# Patient Record
Sex: MALE | Race: WHITE | NOT HISPANIC OR LATINO | Employment: OTHER | URBAN - METROPOLITAN AREA
[De-identification: names, ages, dates, MRNs, and addresses within clinical notes are randomized per-mention and may not be internally consistent; named-entity substitution may affect disease eponyms.]

---

## 2023-01-08 ENCOUNTER — APPOINTMENT (OUTPATIENT)
Dept: RADIOLOGY | Facility: MEDICAL CENTER | Age: 23
End: 2023-01-08
Attending: EMERGENCY MEDICINE
Payer: COMMERCIAL

## 2023-01-08 ENCOUNTER — HOSPITAL ENCOUNTER (EMERGENCY)
Facility: MEDICAL CENTER | Age: 23
End: 2023-01-08
Attending: EMERGENCY MEDICINE
Payer: COMMERCIAL

## 2023-01-08 VITALS
RESPIRATION RATE: 14 BRPM | WEIGHT: 166.23 LBS | SYSTOLIC BLOOD PRESSURE: 100 MMHG | TEMPERATURE: 96.9 F | BODY MASS INDEX: 25.19 KG/M2 | HEART RATE: 64 BPM | DIASTOLIC BLOOD PRESSURE: 55 MMHG | OXYGEN SATURATION: 94 % | HEIGHT: 68 IN

## 2023-01-08 DIAGNOSIS — K04.7 DENTAL INFECTION: ICD-10-CM

## 2023-01-08 LAB
ANION GAP SERPL CALC-SCNC: 11 MMOL/L (ref 7–16)
BASOPHILS # BLD AUTO: 0.3 % (ref 0–1.8)
BASOPHILS # BLD: 0.03 K/UL (ref 0–0.12)
BUN SERPL-MCNC: 11 MG/DL (ref 8–22)
CALCIUM SERPL-MCNC: 9.6 MG/DL (ref 8.5–10.5)
CHLORIDE SERPL-SCNC: 100 MMOL/L (ref 96–112)
CO2 SERPL-SCNC: 28 MMOL/L (ref 20–33)
CREAT SERPL-MCNC: 0.85 MG/DL (ref 0.5–1.4)
EOSINOPHIL # BLD AUTO: 0.17 K/UL (ref 0–0.51)
EOSINOPHIL NFR BLD: 1.7 % (ref 0–6.9)
ERYTHROCYTE [DISTWIDTH] IN BLOOD BY AUTOMATED COUNT: 40.3 FL (ref 35.9–50)
GFR SERPLBLD CREATININE-BSD FMLA CKD-EPI: 125 ML/MIN/1.73 M 2
GLUCOSE SERPL-MCNC: 108 MG/DL (ref 65–99)
HCT VFR BLD AUTO: 48.5 % (ref 42–52)
HGB BLD-MCNC: 16.3 G/DL (ref 14–18)
IMM GRANULOCYTES # BLD AUTO: 0.04 K/UL (ref 0–0.11)
IMM GRANULOCYTES NFR BLD AUTO: 0.4 % (ref 0–0.9)
LYMPHOCYTES # BLD AUTO: 2.07 K/UL (ref 1–4.8)
LYMPHOCYTES NFR BLD: 21.1 % (ref 22–41)
MCH RBC QN AUTO: 31.2 PG (ref 27–33)
MCHC RBC AUTO-ENTMCNC: 33.6 G/DL (ref 33.7–35.3)
MCV RBC AUTO: 92.9 FL (ref 81.4–97.8)
MONOCYTES # BLD AUTO: 1.17 K/UL (ref 0–0.85)
MONOCYTES NFR BLD AUTO: 11.9 % (ref 0–13.4)
NEUTROPHILS # BLD AUTO: 6.35 K/UL (ref 1.82–7.42)
NEUTROPHILS NFR BLD: 64.6 % (ref 44–72)
NRBC # BLD AUTO: 0 K/UL
NRBC BLD-RTO: 0 /100 WBC
PLATELET # BLD AUTO: 304 K/UL (ref 164–446)
PMV BLD AUTO: 8.6 FL (ref 9–12.9)
POTASSIUM SERPL-SCNC: 4.6 MMOL/L (ref 3.6–5.5)
RBC # BLD AUTO: 5.22 M/UL (ref 4.7–6.1)
SODIUM SERPL-SCNC: 139 MMOL/L (ref 135–145)
WBC # BLD AUTO: 9.8 K/UL (ref 4.8–10.8)

## 2023-01-08 PROCEDURE — 36415 COLL VENOUS BLD VENIPUNCTURE: CPT

## 2023-01-08 PROCEDURE — 70487 CT MAXILLOFACIAL W/DYE: CPT

## 2023-01-08 PROCEDURE — 700105 HCHG RX REV CODE 258: Performed by: EMERGENCY MEDICINE

## 2023-01-08 PROCEDURE — 99284 EMERGENCY DEPT VISIT MOD MDM: CPT

## 2023-01-08 PROCEDURE — 80048 BASIC METABOLIC PNL TOTAL CA: CPT

## 2023-01-08 PROCEDURE — 96375 TX/PRO/DX INJ NEW DRUG ADDON: CPT

## 2023-01-08 PROCEDURE — 96365 THER/PROPH/DIAG IV INF INIT: CPT

## 2023-01-08 PROCEDURE — 85025 COMPLETE CBC W/AUTO DIFF WBC: CPT

## 2023-01-08 PROCEDURE — 700117 HCHG RX CONTRAST REV CODE 255: Performed by: EMERGENCY MEDICINE

## 2023-01-08 PROCEDURE — 700111 HCHG RX REV CODE 636 W/ 250 OVERRIDE (IP): Performed by: EMERGENCY MEDICINE

## 2023-01-08 RX ORDER — MORPHINE SULFATE 4 MG/ML
4 INJECTION INTRAVENOUS ONCE
Status: COMPLETED | OUTPATIENT
Start: 2023-01-08 | End: 2023-01-08

## 2023-01-08 RX ORDER — AMOXICILLIN AND CLAVULANATE POTASSIUM 875; 125 MG/1; MG/1
1 TABLET, FILM COATED ORAL 2 TIMES DAILY
Qty: 20 TABLET | Refills: 0 | Status: SHIPPED | OUTPATIENT
Start: 2023-01-08 | End: 2023-01-18

## 2023-01-08 RX ORDER — KETOROLAC TROMETHAMINE 30 MG/ML
15 INJECTION, SOLUTION INTRAMUSCULAR; INTRAVENOUS ONCE
Status: COMPLETED | OUTPATIENT
Start: 2023-01-08 | End: 2023-01-08

## 2023-01-08 RX ORDER — HYDROCODONE BITARTRATE AND ACETAMINOPHEN 5; 325 MG/1; MG/1
1 TABLET ORAL EVERY 6 HOURS PRN
Qty: 12 TABLET | Refills: 0 | Status: SHIPPED | OUTPATIENT
Start: 2023-01-08 | End: 2023-01-11

## 2023-01-08 RX ADMIN — AMPICILLIN AND SULBACTAM 3 G: 1; 2 INJECTION, POWDER, FOR SOLUTION INTRAMUSCULAR; INTRAVENOUS at 16:00

## 2023-01-08 RX ADMIN — IOHEXOL 75 ML: 350 INJECTION, SOLUTION INTRAVENOUS at 17:17

## 2023-01-08 RX ADMIN — MORPHINE SULFATE 4 MG: 4 INJECTION INTRAVENOUS at 15:57

## 2023-01-08 RX ADMIN — KETOROLAC TROMETHAMINE 15 MG: 30 INJECTION, SOLUTION INTRAMUSCULAR; INTRAVENOUS at 15:57

## 2023-01-08 ASSESSMENT — LIFESTYLE VARIABLES: DO YOU DRINK ALCOHOL: NO

## 2023-01-08 NOTE — ED NOTES
Pt ambulatory to room with steady gait. Agree with triage note.   Has had pain to area about a week, since wisdom tooth began to come out. FT sided facial swelling started 2 days ago.

## 2023-01-08 NOTE — ED TRIAGE NOTES
Chief Complaint   Patient presents with    Dental Pain     Swelling to right lower jaw due to wisdom tooth impaction.

## 2023-01-09 NOTE — ED PROVIDER NOTES
"ED Provider Note    CHIEF COMPLAINT  Chief Complaint   Patient presents with    Dental Pain     Swelling to right lower jaw due to wisdom tooth impaction.        HPI/ROS  Car Esquivel Jr. is a 22 y.o. male who presents to the emergency department complaining of dental pain associated with a right lower wisdom tooth.  The patient has been aware that the wisdom tooth is impacted and he started developing pain 2 days ago and now he is developing some pain on the cheek and adjacent to the mandible on the right side so is come to the emergency department for evaluation    PAST MEDICAL HISTORY       SURGICAL HISTORY  patient denies any surgical history    FAMILY HISTORY  No family history on file.    SOCIAL HISTORY  Social History     Tobacco Use    Smoking status: Not on file    Smokeless tobacco: Not on file   Substance and Sexual Activity    Alcohol use: Not on file    Drug use: Not on file    Sexual activity: Not on file       CURRENT MEDICATIONS  Home Medications       Reviewed by Heide Burt R.N. (Registered Nurse) on 01/08/23 at 1448  Med List Status: Partial     Medication Last Dose Status        Patient Dawson Taking any Medications                           ALLERGIES  No Known Allergies    PHYSICAL EXAM  VITAL SIGNS: /55   Pulse 64   Temp 36.1 °C (96.9 °F)   Resp 14   Ht 1.727 m (5' 8\")   Wt 75.4 kg (166 lb 3.6 oz)   SpO2 94%   BMI 25.27 kg/m²    Constitutional: Awake lucid verbal he does not appear toxic or distressed  HENT: There is mild to moderate swelling on the right side of the face adjacent to the mandible no erythema no skin breakdown.  The patient does have limited mouth opening but what I can see is that there is an impacted wisdom tooth in the right lower dentition and there is surrounding erythema and induration.  The patient's voice is clear his tongue position looks normal he does not appear to have swelling under the tongue.  Eyes: No erythema or discharge  Neck: No masses of " the neck trachea midline no JVD    DIAGNOSTIC STUDIES / PROCEDURES  After initial review with the oral surgeon on-call Dr. Hughes labs and a CT were obtained  LABS  CBC shows normal white blood cell count of 9.8 basic metabolic panel is unremarkable with a normal blood sugar of 108.    RADIOLOGY  CT-MAXILLOFACIAL WITH PLUS RECONS   Final Result      1.  RIGHT facial soft tissue inflammatory changes overlying the mandible.   2.  No soft tissue abscess.   3.  Mild RIGHT submandibular adenopathy, likely reactive.   4.  No tooth abscess demonstrated.   5.  Mild chronic paranasal sinus disease.            COURSE & MEDICAL DECISION MAKING  In the emergency department an IV was established the patient was given intravenous morphine and Toradol and Unasyn.  After the evaluation was completed I again reviewed the case with the oral surgeon and at this point the patient would be a candidate for outpatient therapy and he is likely to require a tooth extraction and I have reviewed all this with the patient but he now tells me that he lives in [place] and would rather go home and see an oral surgeon in his community therefore I have sent prescriptions for Augmentin and [place] to the pharmacy.  I have reviewed risks and benefits of narcotic pain medication use with the patient and informed consent obtained.  I have advised the patient to call an oral surgeon in his community first thing tomorrow morning and arrange office follow-up with soon as possible        FINAL DIAGNOSIS  1. Dental infection           Electronically signed by: Julien Ochoa M.D., 1/8/2023 6:42 PM

## 2023-01-09 NOTE — DISCHARGE INSTRUCTIONS
Call an oral surgeon in your home community first thing tomorrow morning and arrange office follow-up as soon as possible for further evaluation and treatment

## 2023-01-09 NOTE — ED NOTES
Assist RN  Discharge instructions given to patient, prescriptions provided, a verbal understanding of all instructions was stated. IV removed, cathlon intact, site without s/s of infection. Pt preferred to walk out accompanied by self VSS,  all belongings accounted for.